# Patient Record
Sex: FEMALE | Race: WHITE | NOT HISPANIC OR LATINO | URBAN - METROPOLITAN AREA
[De-identification: names, ages, dates, MRNs, and addresses within clinical notes are randomized per-mention and may not be internally consistent; named-entity substitution may affect disease eponyms.]

---

## 2024-03-17 NOTE — PROGRESS NOTES
This is a 38 year old female for ER FU visit (no notes obtainable yet on EPIC)              ROS is NEG for HEADACHE, NAUSEA, VOMITING, DIARRHEA, CHEST PAIN, SOB, and BLEEDING and as further REVIEWED BELOW.      Subjective   Melyssa Monsalve is a 38 y.o. female who presents for No chief complaint on file..    HPI:    Per nursing intake, pt here for No chief complaint on file.       Review of systems is essentially negative for all systems except for any identified issues in HPI above.    Objective     There were no vitals taken for this visit.     Physical Examination:       GENERAL           General Appearance: well-appearing, well-developed, well-hydrated, well-nourished, no acute distress.        HEENT           NECK supple, no masses or thyromegaly, no carotid bruit.        EYES           Extraocular Movements: normal, bilateral eyes NEHA, no conjunctival injection.        HEART           Rate and Rhythm regular rate and rhythm. Heart sounds: normal S1S2, no S3 or S4. Murmurs: none.        CHEST           Breath sounds: Clear to IPPA, RR<16 no use of accessory muscles.        ABDOMEN           General: Neg for LKKS or masses, no scleral icterus or jaundice.        MUSCULOSKELETAL           Joints Demonstration: Neg for erythema, swelling or joint deformities. gross abnormalities no gross abnormalities.        EXTREMITIES           Lower Extremities: Neg for cyanosis, clubbing or edema.       Assessment/Plan   Problem List Items Addressed This Visit    None      FOLLOW UP:  PRN and as specified above         Nancy Pope M.D.

## 2024-03-19 NOTE — PROGRESS NOTES
This is a 38 year old female for ER FU visit      ROS is NEG for HEADACHE, NAUSEA, VOMITING, DIARRHEA, CHEST PAIN, SOB, and BLEEDING and as further REVIEWED BELOW.    Subjective   Melyssa Monsalve is a 38 y.o. female who presents for No chief complaint on file..    HPI:    Per nursing intake, pt here for No chief complaint on file.       Review of systems is essentially negative for all systems except for any identified issues in HPI above.    Objective     There were no vitals taken for this visit.     Physical Examination:       GENERAL           General Appearance: well-appearing, well-developed, well-hydrated, well-nourished, no acute distress.        HEENT           NECK supple, no masses or thyromegaly, no carotid bruit.        EYES           Extraocular Movements: normal, bilateral eyes NEHA, no conjunctival injection.        HEART           Rate and Rhythm regular rate and rhythm. Heart sounds: normal S1S2, no S3 or S4. Murmurs: none.        CHEST           Breath sounds: Clear to IPPA, RR<16 no use of accessory muscles.        ABDOMEN           General: Neg for LKKS or masses, no scleral icterus or jaundice.        MUSCULOSKELETAL           Joints Demonstration: Neg for erythema, swelling or joint deformities. gross abnormalities no gross abnormalities.        EXTREMITIES           Lower Extremities: Neg for cyanosis, clubbing or edema.       Assessment/Plan   Problem List Items Addressed This Visit    None      FOLLOW UP:  PRN and as specified above         Nancy Pope M.D.

## 2024-03-22 ENCOUNTER — APPOINTMENT (OUTPATIENT)
Dept: PRIMARY CARE | Facility: CLINIC | Age: 39
End: 2024-03-22
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
## 2024-03-26 ENCOUNTER — OFFICE VISIT (OUTPATIENT)
Dept: PRIMARY CARE | Facility: CLINIC | Age: 39
End: 2024-03-26

## 2024-03-26 VITALS
DIASTOLIC BLOOD PRESSURE: 80 MMHG | WEIGHT: 222.4 LBS | HEART RATE: 69 BPM | OXYGEN SATURATION: 99 % | TEMPERATURE: 96.8 F | SYSTOLIC BLOOD PRESSURE: 116 MMHG | BODY MASS INDEX: 32.84 KG/M2

## 2024-03-26 DIAGNOSIS — Z87.820 HX OF CONCUSSION: ICD-10-CM

## 2024-03-26 DIAGNOSIS — Z71.9 HEALTH COUNSELING: ICD-10-CM

## 2024-03-26 DIAGNOSIS — S09.90XD INJURY OF HEAD, SUBSEQUENT ENCOUNTER: ICD-10-CM

## 2024-03-26 DIAGNOSIS — T30.0 BURN: Primary | ICD-10-CM

## 2024-03-26 DIAGNOSIS — Z09 HOSPITAL DISCHARGE FOLLOW-UP: ICD-10-CM

## 2024-03-26 DIAGNOSIS — Z71.85 IMMUNIZATION COUNSELING: ICD-10-CM

## 2024-03-26 DIAGNOSIS — Z48.00 CHANGE OR REMOVAL OF NONSURGICAL WOUND DRESSING: ICD-10-CM

## 2024-03-26 PROCEDURE — 1036F TOBACCO NON-USER: CPT | Performed by: FAMILY MEDICINE

## 2024-03-26 PROCEDURE — 99214 OFFICE O/P EST MOD 30 MIN: CPT | Performed by: FAMILY MEDICINE

## 2024-03-26 RX ORDER — TRAMADOL HYDROCHLORIDE 50 MG/1
50 TABLET ORAL EVERY 8 HOURS PRN
COMMUNITY
Start: 2024-03-23 | End: 2024-03-26

## 2024-03-26 RX ORDER — LISINOPRIL 2.5 MG/1
2.5 TABLET ORAL DAILY
COMMUNITY
Start: 2024-02-05

## 2024-03-26 RX ORDER — MECLIZINE HYDROCHLORIDE 25 MG/1
25 TABLET ORAL 3 TIMES DAILY PRN
COMMUNITY
Start: 2023-09-10

## 2024-03-26 ASSESSMENT — PATIENT HEALTH QUESTIONNAIRE - PHQ9
1. LITTLE INTEREST OR PLEASURE IN DOING THINGS: NOT AT ALL
SUM OF ALL RESPONSES TO PHQ9 QUESTIONS 1 AND 2: 0
2. FEELING DOWN, DEPRESSED OR HOPELESS: NOT AT ALL

## 2024-03-26 ASSESSMENT — PAIN SCALES - GENERAL: PAINLEVEL: 6
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
## 2024-08-08 DIAGNOSIS — Z76.0 MEDICATION REFILL: ICD-10-CM

## 2024-08-08 DIAGNOSIS — Z76.0 MEDICINE REFILL: ICD-10-CM

## 2024-08-08 RX ORDER — ALBUTEROL SULFATE 90 UG/1
2 INHALANT RESPIRATORY (INHALATION) EVERY 4 HOURS PRN
COMMUNITY
Start: 2022-10-14 | End: 2024-08-08 | Stop reason: SDUPTHER

## 2024-08-08 RX ORDER — ALBUTEROL SULFATE 90 UG/1
2 INHALANT RESPIRATORY (INHALATION) EVERY 4 HOURS PRN
Qty: 18 G | Refills: 11 | Status: SHIPPED | OUTPATIENT
Start: 2024-08-08 | End: 2024-10-07

## 2024-08-08 NOTE — TELEPHONE ENCOUNTER
Pt left VM requesting a refill on inhalers.  mometasone-formoterol (Dulera 200) 200-5 mcg/actuation inhaler FOR AM and PM.  And albuterol 90 mcg/actuation inhaler for PRN. Due to humidity pt has been wheezing and having some SOB upon exertion. Tried to call pt but no answer, VM left.  Rx's pop.  Last O.V. 3/26/24

## 2025-04-14 ENCOUNTER — OFFICE VISIT (OUTPATIENT)
Dept: PRIMARY CARE | Facility: CLINIC | Age: 40
End: 2025-04-14
Payer: COMMERCIAL

## 2025-04-14 VITALS
HEART RATE: 72 BPM | OXYGEN SATURATION: 99 % | TEMPERATURE: 97.1 F | DIASTOLIC BLOOD PRESSURE: 86 MMHG | BODY MASS INDEX: 33.18 KG/M2 | HEIGHT: 69 IN | WEIGHT: 224 LBS | SYSTOLIC BLOOD PRESSURE: 136 MMHG

## 2025-04-14 DIAGNOSIS — I10 HYPERTENSION, UNSPECIFIED TYPE: ICD-10-CM

## 2025-04-14 DIAGNOSIS — Z76.0 MEDICATION REFILL: ICD-10-CM

## 2025-04-14 DIAGNOSIS — E66.811 CLASS 1 OBESITY WITHOUT SERIOUS COMORBIDITY WITH BODY MASS INDEX (BMI) OF 33.0 TO 33.9 IN ADULT, UNSPECIFIED OBESITY TYPE: ICD-10-CM

## 2025-04-14 DIAGNOSIS — J45.909 MODERATE ASTHMA, UNSPECIFIED WHETHER COMPLICATED, UNSPECIFIED WHETHER PERSISTENT (HHS-HCC): Primary | ICD-10-CM

## 2025-04-14 DIAGNOSIS — Z13.220 SCREENING FOR LIPID DISORDERS: ICD-10-CM

## 2025-04-14 DIAGNOSIS — R68.89 COLD INTOLERANCE: ICD-10-CM

## 2025-04-14 PROCEDURE — 3079F DIAST BP 80-89 MM HG: CPT | Performed by: PHYSICIAN ASSISTANT

## 2025-04-14 PROCEDURE — 3075F SYST BP GE 130 - 139MM HG: CPT | Performed by: PHYSICIAN ASSISTANT

## 2025-04-14 PROCEDURE — 3008F BODY MASS INDEX DOCD: CPT | Performed by: PHYSICIAN ASSISTANT

## 2025-04-14 PROCEDURE — 99214 OFFICE O/P EST MOD 30 MIN: CPT | Performed by: PHYSICIAN ASSISTANT

## 2025-04-14 PROCEDURE — 1036F TOBACCO NON-USER: CPT | Performed by: PHYSICIAN ASSISTANT

## 2025-04-14 RX ORDER — LOSARTAN POTASSIUM 25 MG/1
TABLET ORAL
Qty: 90 TABLET | Refills: 3 | Status: SHIPPED | OUTPATIENT
Start: 2025-04-14

## 2025-04-14 RX ORDER — ALBUTEROL SULFATE 90 UG/1
2 INHALANT RESPIRATORY (INHALATION) EVERY 4 HOURS PRN
Qty: 18 G | Refills: 11 | Status: SHIPPED | OUTPATIENT
Start: 2025-04-14 | End: 2025-06-13

## 2025-04-14 ASSESSMENT — ENCOUNTER SYMPTOMS
NEUROLOGICAL NEGATIVE: 1
STRIDOR: 0
MUSCULOSKELETAL NEGATIVE: 1
LOSS OF SENSATION IN FEET: 0
ALLERGIC/IMMUNOLOGIC NEGATIVE: 1
PSYCHIATRIC NEGATIVE: 1
HEMATOLOGIC/LYMPHATIC NEGATIVE: 1
HYPERTENSION: 1
PALPITATIONS: 0
CONSTITUTIONAL NEGATIVE: 1
OCCASIONAL FEELINGS OF UNSTEADINESS: 0
DEPRESSION: 0
CHEST TIGHTNESS: 0
WHEEZING: 0
SHORTNESS OF BREATH: 1
GASTROINTESTINAL NEGATIVE: 1
EYES NEGATIVE: 1
POLYPHAGIA: 0
APNEA: 0
POLYDIPSIA: 0
COUGH: 0
CHOKING: 0

## 2025-04-14 ASSESSMENT — PATIENT HEALTH QUESTIONNAIRE - PHQ9
2. FEELING DOWN, DEPRESSED OR HOPELESS: NOT AT ALL
SUM OF ALL RESPONSES TO PHQ9 QUESTIONS 1 AND 2: 0
1. LITTLE INTEREST OR PLEASURE IN DOING THINGS: NOT AT ALL

## 2025-04-14 ASSESSMENT — COLUMBIA-SUICIDE SEVERITY RATING SCALE - C-SSRS
1. IN THE PAST MONTH, HAVE YOU WISHED YOU WERE DEAD OR WISHED YOU COULD GO TO SLEEP AND NOT WAKE UP?: NO
2. HAVE YOU ACTUALLY HAD ANY THOUGHTS OF KILLING YOURSELF?: NO
6. HAVE YOU EVER DONE ANYTHING, STARTED TO DO ANYTHING, OR PREPARED TO DO ANYTHING TO END YOUR LIFE?: NO

## 2025-04-14 ASSESSMENT — PAIN SCALES - GENERAL: PAINLEVEL_OUTOF10: 0-NO PAIN

## 2025-04-14 NOTE — PROGRESS NOTES
Subjective     Patient ID: Melyssa Monsalve is a 39 y.o. female who presents for Asthma.    Asthma  She complains of shortness of breath. There is no cough or wheezing. Pertinent negatives include no chest pain. Her past medical history is significant for asthma.   Hypertension  Associated symptoms include shortness of breath. Pertinent negatives include no chest pain or palpitations.     Melyssa Monsalve is seen for her chronic issues.      Patient is new to me today, she wishes to establish care.  She has several concerns listed below    Asthma  -Patient reports that she sees a pulmonologist but was unable to get into see an appointment till May, she reports this time a year she always gets asthma exacerbations.  She usually is on Dulera and albuterol.  She does not have albuterol or Dulera at this point.  So we will restart these medications.  -She does admit to mild shortness of breath but otherwise denies all red flag signs or symptoms.  She specifically denies any chest pain, dyspnea on exertion, palpitations, chest pressure, dizziness, AMS, one-sided weakness, history of, slurring her words.  -She will follow-up with her pulmonologist as scheduled, I do not believe she needs oral steroids at this time    Cold intolerance  - Check TSH, iron, TIBC, vitamin B12, folic acid, vitamin D, hemoglobin A1c.    Hypertension  - Was on lisinopril 2.5 mg/day, I suspect this may be contributing to her asthma to some degree, will switch over to losartan.  Recheck in 2 weeks.  - Patient denies any chance of pregnancy, asked because of medication use.    Screening labs  - Check vitamin D, lipid panel.  Patient already has OB/GYN appointment scheduled.  Reminded patient to get cervical cancer screening, mammogram at age 42    Review of Systems   Constitutional: Negative.    HENT: Negative.     Eyes: Negative.    Respiratory:  Positive for shortness of breath. Negative for apnea, cough, choking, chest tightness, wheezing and  "stridor.    Cardiovascular:  Negative for chest pain, palpitations and leg swelling.   Gastrointestinal: Negative.    Endocrine: Positive for cold intolerance. Negative for heat intolerance, polydipsia, polyphagia and polyuria.   Genitourinary: Negative.    Musculoskeletal: Negative.    Skin: Negative.    Allergic/Immunologic: Negative.    Neurological: Negative.    Hematological: Negative.    Psychiatric/Behavioral: Negative.         Objective  Vitals:  /86   Pulse 72   Temp 36.2 °C (97.1 °F)   Ht 1.753 m (5' 9\")   Wt 102 kg (224 lb)   SpO2 99%   BMI 33.08 kg/m²     Physical Exam  Vitals reviewed.   Constitutional:       General: She is not in acute distress.     Appearance: Normal appearance. She is obese. She is not ill-appearing, toxic-appearing or diaphoretic.   HENT:      Head: Normocephalic and atraumatic.      Nose: Nose normal. No congestion.      Mouth/Throat:      Mouth: Mucous membranes are moist.      Pharynx: No oropharyngeal exudate or posterior oropharyngeal erythema.   Eyes:      Extraocular Movements: Extraocular movements intact.      Conjunctiva/sclera: Conjunctivae normal.      Pupils: Pupils are equal, round, and reactive to light.   Neck:      Vascular: No carotid bruit.   Cardiovascular:      Rate and Rhythm: Normal rate and regular rhythm.      Pulses: Normal pulses.      Heart sounds: Normal heart sounds.   Pulmonary:      Effort: Pulmonary effort is normal. No respiratory distress.      Breath sounds: Normal breath sounds. No stridor. No wheezing, rhonchi or rales.   Chest:      Chest wall: No tenderness.   Abdominal:      General: Bowel sounds are normal.      Palpations: Abdomen is soft.   Musculoskeletal:         General: No swelling, tenderness, deformity or signs of injury. Normal range of motion.      Cervical back: Normal range of motion. No rigidity or tenderness.      Right lower leg: No edema.      Left lower leg: No edema.   Lymphadenopathy:      Cervical: No " cervical adenopathy.   Skin:     General: Skin is warm.      Capillary Refill: Capillary refill takes less than 2 seconds.      Coloration: Skin is not jaundiced or pale.      Findings: No bruising, erythema, lesion or rash.   Neurological:      General: No focal deficit present.      Mental Status: She is alert and oriented to person, place, and time.      Cranial Nerves: No cranial nerve deficit.      Sensory: No sensory deficit.      Motor: No weakness.      Coordination: Coordination normal.      Gait: Gait normal.      Deep Tendon Reflexes: Reflexes normal.   Psychiatric:         Mood and Affect: Mood normal.         Behavior: Behavior normal.         Thought Content: Thought content normal.         Judgment: Judgment normal.       CBC:   Lab Results   Component Value Date    WBC 7.8 02/23/2021    RBC 4.49 02/23/2021    HGB 13.9 02/23/2021    HCT 42.8 02/23/2021    MCV 95.3 02/23/2021    MCH 31.0 02/23/2021    MCHC 32.5 02/23/2021    RDWS 46.9 02/23/2021    RDWC 13.3 02/23/2021     02/23/2021    MPV 10.2 02/23/2021       CBC w/Diff:   Lab Results   Component Value Date    WBC 7.8 02/23/2021    NRBC 0 02/23/2021    RBC 4.49 02/23/2021    HGB 13.9 02/23/2021    HCT 42.8 02/23/2021    MCV 95.3 02/23/2021    MCHC 32.5 02/23/2021     02/23/2021    LYMPHOPCT 35.30 03/20/2019    MONOPCT 6.70 03/20/2019    BASOPCT 0.50 03/20/2019    NEUTROABS 4.46 03/20/2019    LYMPHSABS 3.00 03/20/2019    MONOSABS 0.57 03/20/2019    EOSABS 0.42 03/20/2019    BASOSABS 0.04 03/20/2019        CMP:  Lab Results   Component Value Date    GLUCOSE 85 02/23/2021     02/23/2021    K 4.0 02/23/2021     02/23/2021    CO2 25 02/23/2021    ANIONGAP 10 02/23/2021    BUN 15 02/23/2021    CREATININE 1.0 02/23/2021    CALCIUM 9.4 02/23/2021        BMP:  Lab Results   Component Value Date    GLUCOSE 85 02/23/2021    BUN 15 02/23/2021    CREATININE 1.0 02/23/2021    UREACREAUR 15.0 02/23/2021     02/23/2021    K 4.0  "02/23/2021     02/23/2021    CO2 25 02/23/2021    ANIONGAP 10 02/23/2021    CALCIUM 9.4 02/23/2021    EGFR 68 02/23/2021        Lipid:   No results found for: \"CHOL\", \"HDL\", \"CHHDL\", \"LDLCALC\", \"VLDL\", \"TRIG\"    LDL Direct:  No results found for: \"LDLDIRECT\"     TSH:   Lab Results   Component Value Date    TSH 2.52 02/23/2021        B12:  No results found for: \"SSPFPEHE10\"    Vitamin D:  No results found for: \"VITD25\"     HgA1c:   No results found for: \"HGBA1C\", \"IDFQKLPL6R\"    PSA:   No results found for: \"PSA\"    FreeT4:  No results found for: \"FREET4\"    T3:   No results found for: \"T3FREE\"    1. Moderate asthma, unspecified whether complicated, unspecified whether persistent ([facility])  mometasone-formoterol (Dulera 100) 100-5 mcg/actuation inhaler    Restart dulera/albuterol. See HPI. Keep appointment with cardiology. No oral steroids needed at this time. Doubt any cardiac etiology.      2. Medication refill  albuterol 90 mcg/actuation inhaler      3. Hypertension, unspecified type  losartan (Cozaar) 25 mg tablet    Tsh With Reflex To Free T4 If Abnormal    Hemoglobin A1c    Vitamin D 25-Hydroxy,Total (for eval of Vitamin D levels)    Tsh With Reflex To Free T4 If Abnormal    Hemoglobin A1c    Vitamin D 25-Hydroxy,Total (for eval of Vitamin D levels)    Switch lisinopril to losartan due to cough side effect. Recheck in two weeks      4. Cold intolerance  Iron and TIBC    Vitamin B12    Folate    Hemoglobin A1c    Vitamin D 25-Hydroxy,Total (for eval of Vitamin D levels)    Iron and TIBC    Vitamin B12    Folate    Hemoglobin A1c    Vitamin D 25-Hydroxy,Total (for eval of Vitamin D levels)    Labs as ordered.      5. Screening for lipid disorders  Lipid panel    Lipid panel      6. Class 1 obesity without serious comorbidity with body mass index (BMI) of 33.0 to 33.9 in adult, unspecified obesity type  Referral to Nutrition Services         Return to clinic in 2 weeks    If symptoms severely worsen or you " experience any new concerning symptoms, go to the nearest emergency room or call 911 as needed.

## 2025-04-14 NOTE — PATIENT INSTRUCTIONS
Return to clinic in 2 weeks    If symptoms severely worsen or you experience any new concerning symptoms, go to the nearest emergency room or call 911 as needed.

## 2025-04-15 ENCOUNTER — TELEPHONE (OUTPATIENT)
Dept: PRIMARY CARE | Facility: CLINIC | Age: 40
End: 2025-04-15
Payer: COMMERCIAL

## 2025-04-15 DIAGNOSIS — B00.1 COLD SORE: Primary | ICD-10-CM

## 2025-04-15 RX ORDER — VALACYCLOVIR HYDROCHLORIDE 1 G/1
2000 TABLET, FILM COATED ORAL 2 TIMES DAILY
Qty: 4 TABLET | Refills: 0 | Status: SHIPPED | OUTPATIENT
Start: 2025-04-15 | End: 2025-04-16

## 2025-04-15 NOTE — TELEPHONE ENCOUNTER
Valtrex called in. If there is any chance of pregnancy, patient should take OTC pregnancy test prior to use.    Peng Shin PA-C

## 2025-04-15 NOTE — TELEPHONE ENCOUNTER
Pt lvm stating she woke up with a big cold sore on her face asking if something could be sent in for her

## 2025-04-28 ENCOUNTER — OFFICE VISIT (OUTPATIENT)
Dept: PRIMARY CARE | Facility: CLINIC | Age: 40
End: 2025-04-28
Payer: COMMERCIAL

## 2025-04-28 VITALS
HEIGHT: 69 IN | DIASTOLIC BLOOD PRESSURE: 78 MMHG | WEIGHT: 221.8 LBS | HEART RATE: 75 BPM | SYSTOLIC BLOOD PRESSURE: 118 MMHG | BODY MASS INDEX: 32.85 KG/M2 | OXYGEN SATURATION: 100 % | TEMPERATURE: 96.8 F

## 2025-04-28 DIAGNOSIS — I10 HYPERTENSION, UNSPECIFIED TYPE: Primary | ICD-10-CM

## 2025-04-28 PROCEDURE — 3074F SYST BP LT 130 MM HG: CPT | Performed by: PHYSICIAN ASSISTANT

## 2025-04-28 PROCEDURE — 3008F BODY MASS INDEX DOCD: CPT | Performed by: PHYSICIAN ASSISTANT

## 2025-04-28 PROCEDURE — 3078F DIAST BP <80 MM HG: CPT | Performed by: PHYSICIAN ASSISTANT

## 2025-04-28 PROCEDURE — 99213 OFFICE O/P EST LOW 20 MIN: CPT | Performed by: PHYSICIAN ASSISTANT

## 2025-04-28 PROCEDURE — 1036F TOBACCO NON-USER: CPT | Performed by: PHYSICIAN ASSISTANT

## 2025-04-28 ASSESSMENT — ENCOUNTER SYMPTOMS
HEMATOLOGIC/LYMPHATIC NEGATIVE: 1
GASTROINTESTINAL NEGATIVE: 1
CARDIOVASCULAR NEGATIVE: 1
EYES NEGATIVE: 1
ALLERGIC/IMMUNOLOGIC NEGATIVE: 1
MUSCULOSKELETAL NEGATIVE: 1
CONSTITUTIONAL NEGATIVE: 1
RESPIRATORY NEGATIVE: 1
NEUROLOGICAL NEGATIVE: 1
ENDOCRINE NEGATIVE: 1
PSYCHIATRIC NEGATIVE: 1

## 2025-04-28 ASSESSMENT — PATIENT HEALTH QUESTIONNAIRE - PHQ9
1. LITTLE INTEREST OR PLEASURE IN DOING THINGS: NOT AT ALL
2. FEELING DOWN, DEPRESSED OR HOPELESS: NOT AT ALL
SUM OF ALL RESPONSES TO PHQ9 QUESTIONS 1 AND 2: 0

## 2025-04-28 ASSESSMENT — PAIN SCALES - GENERAL: PAINLEVEL_OUTOF10: 0-NO PAIN

## 2025-04-28 NOTE — PROGRESS NOTES
"Subjective     Patient ID: Melyssa Monsalve is a 39 y.o. female who presents for Follow-up (Pt here for HTN follow up. ).    HPI  Melyssa Monsalve is seen for her chronic issues.      Patient did not obtain blood work due to her life circumstances so far.  She is here to follow-up on her blood pressure.  She does report improving her diet and exercise.  She is down 3 pounds.  She is compliant with her medication.  She states her asthma is very well-controlled, she is sleeping great.    Hypertension  - Patient is well-controlled on losartan.  Continue current medications.  Follow-up in 1 month      Review of Systems   Constitutional: Negative.    HENT: Negative.     Eyes: Negative.    Respiratory: Negative.     Cardiovascular: Negative.    Gastrointestinal: Negative.    Endocrine: Negative.    Genitourinary: Negative.    Musculoskeletal: Negative.    Skin: Negative.    Allergic/Immunologic: Negative.    Neurological: Negative.    Hematological: Negative.    Psychiatric/Behavioral: Negative.         Objective  Vitals:  Pulse 75   Temp 36 °C (96.8 °F)   Ht 1.753 m (5' 9\")   Wt 101 kg (221 lb 12.8 oz)   SpO2 100%   BMI 32.75 kg/m²     Physical Exam  Vitals reviewed.   Constitutional:       General: She is not in acute distress.     Appearance: Normal appearance. She is obese. She is not ill-appearing, toxic-appearing or diaphoretic.   HENT:      Head: Normocephalic and atraumatic.      Right Ear: External ear normal.      Left Ear: External ear normal.      Nose: Nose normal. No congestion.      Mouth/Throat:      Mouth: Mucous membranes are moist.      Pharynx: No oropharyngeal exudate.   Eyes:      General:         Right eye: No discharge.         Left eye: No discharge.      Extraocular Movements: Extraocular movements intact.      Conjunctiva/sclera: Conjunctivae normal.      Pupils: Pupils are equal, round, and reactive to light.   Neck:      Vascular: No carotid bruit.   Cardiovascular:      Rate and " Rhythm: Normal rate and regular rhythm.      Pulses: Normal pulses.      Heart sounds: Normal heart sounds.   Pulmonary:      Effort: Pulmonary effort is normal.      Breath sounds: Normal breath sounds.   Musculoskeletal:         General: No swelling, tenderness, deformity or signs of injury. Normal range of motion.      Cervical back: Normal range of motion. No rigidity or tenderness.      Right lower leg: No edema.      Left lower leg: No edema.   Lymphadenopathy:      Cervical: No cervical adenopathy.   Skin:     General: Skin is warm.      Capillary Refill: Capillary refill takes less than 2 seconds.      Coloration: Skin is not jaundiced or pale.      Findings: No bruising, erythema, lesion or rash.   Neurological:      General: No focal deficit present.      Mental Status: She is alert and oriented to person, place, and time.      Cranial Nerves: No cranial nerve deficit.      Sensory: No sensory deficit.      Motor: No weakness.      Coordination: Coordination normal.      Gait: Gait normal.      Deep Tendon Reflexes: Reflexes normal.   Psychiatric:         Mood and Affect: Mood normal.         Behavior: Behavior normal.         Thought Content: Thought content normal.         Judgment: Judgment normal.         1. Hypertension, unspecified type     Good control. Continue losartan. Keep up the great efforts!     Obtain bloodwork.  Return to clinic in 1 month    If symptoms severely worsen or you experience any new concerning symptoms, go to the nearest emergency room or call 911 as needed.

## 2025-04-28 NOTE — PATIENT INSTRUCTIONS
Obtain bloodwork.  Return to clinic in 1 month    If symptoms severely worsen or you experience any new concerning symptoms, go to the nearest emergency room or call 911 as needed.

## 2025-04-29 LAB
25(OH)D3+25(OH)D2 SERPL-MCNC: 10 NG/ML (ref 30–100)
CHOLEST SERPL-MCNC: 200 MG/DL
CHOLEST/HDLC SERPL: 3.8 (CALC)
EST. AVERAGE GLUCOSE BLD GHB EST-MCNC: 111 MG/DL
EST. AVERAGE GLUCOSE BLD GHB EST-SCNC: 6.2 MMOL/L
FOLATE SERPL-MCNC: 15.1 NG/ML
HBA1C MFR BLD: 5.5 %
HDLC SERPL-MCNC: 53 MG/DL
IRON SATN MFR SERPL: 24 % (CALC) (ref 16–45)
IRON SERPL-MCNC: 91 MCG/DL (ref 40–190)
LDLC SERPL CALC-MCNC: 132 MG/DL (CALC)
NONHDLC SERPL-MCNC: 147 MG/DL (CALC)
TIBC SERPL-MCNC: 374 MCG/DL (CALC) (ref 250–450)
TRIGL SERPL-MCNC: 55 MG/DL
TSH SERPL-ACNC: 2.62 MIU/L
VIT B12 SERPL-MCNC: 388 PG/ML (ref 200–1100)

## 2025-09-03 ENCOUNTER — OFFICE VISIT (OUTPATIENT)
Dept: OTOLARYNGOLOGY | Facility: CLINIC | Age: 40
End: 2025-09-03
Payer: COMMERCIAL

## 2025-09-03 VITALS — WEIGHT: 210 LBS | BODY MASS INDEX: 31.01 KG/M2

## 2025-09-03 DIAGNOSIS — J30.9 ALLERGIC RHINITIS, UNSPECIFIED SEASONALITY, UNSPECIFIED TRIGGER: ICD-10-CM

## 2025-09-03 DIAGNOSIS — H60.92 OTITIS EXTERNA OF LEFT EAR, UNSPECIFIED CHRONICITY, UNSPECIFIED TYPE: Primary | ICD-10-CM

## 2025-09-03 DIAGNOSIS — J34.2 DEVIATED SEPTUM: ICD-10-CM

## 2025-09-03 DIAGNOSIS — H92.03 OTALGIA OF BOTH EARS: ICD-10-CM

## 2025-09-03 DIAGNOSIS — M26.609 TEMPOROMANDIBULAR JOINT DISORDER: ICD-10-CM

## 2025-09-03 RX ORDER — CIPROFLOXACIN AND DEXAMETHASONE 3; 1 MG/ML; MG/ML
3 SUSPENSION/ DROPS AURICULAR (OTIC) 2 TIMES DAILY
Qty: 7.5 ML | Refills: 0 | Status: SHIPPED | OUTPATIENT
Start: 2025-09-03 | End: 2025-09-13

## 2025-09-03 RX ORDER — LISINOPRIL 2.5 MG/1
2.5 TABLET ORAL DAILY
COMMUNITY

## 2025-09-03 RX ORDER — METHYLPREDNISOLONE 4 MG/1
TABLET ORAL
Qty: 21 TABLET | Refills: 0 | Status: SHIPPED | OUTPATIENT
Start: 2025-09-03

## 2025-09-03 RX ORDER — CIPROFLOXACIN 500 MG/1
500 TABLET, FILM COATED ORAL 2 TIMES DAILY
Qty: 14 TABLET | Refills: 0 | Status: SHIPPED | OUTPATIENT
Start: 2025-09-03 | End: 2025-09-10

## 2025-09-07 LAB
BACTERIA EAR AEROBE CULT: ABNORMAL
BACTERIA SPEC AEROBE CULT: NORMAL
BACTERIA SPEC ANAEROBE CULT: NORMAL

## 2025-09-17 ENCOUNTER — APPOINTMENT (OUTPATIENT)
Dept: OTOLARYNGOLOGY | Facility: CLINIC | Age: 40
End: 2025-09-17
Payer: COMMERCIAL